# Patient Record
Sex: FEMALE | Race: OTHER
[De-identification: names, ages, dates, MRNs, and addresses within clinical notes are randomized per-mention and may not be internally consistent; named-entity substitution may affect disease eponyms.]

---

## 2022-12-30 ENCOUNTER — HOSPITAL ENCOUNTER (INPATIENT)
Dept: HOSPITAL 26 - MED | Age: 77
LOS: 5 days | Discharge: HOME | DRG: 425 | End: 2023-01-04
Attending: INTERNAL MEDICINE | Admitting: INTERNAL MEDICINE
Payer: COMMERCIAL

## 2022-12-30 VITALS — DIASTOLIC BLOOD PRESSURE: 68 MMHG | SYSTOLIC BLOOD PRESSURE: 160 MMHG

## 2022-12-30 VITALS — WEIGHT: 120 LBS | HEIGHT: 61 IN | BODY MASS INDEX: 22.66 KG/M2

## 2022-12-30 DIAGNOSIS — Z80.49: ICD-10-CM

## 2022-12-30 DIAGNOSIS — N18.6: ICD-10-CM

## 2022-12-30 DIAGNOSIS — I50.33: ICD-10-CM

## 2022-12-30 DIAGNOSIS — K76.82: ICD-10-CM

## 2022-12-30 DIAGNOSIS — I69.951: ICD-10-CM

## 2022-12-30 DIAGNOSIS — Z63.4: ICD-10-CM

## 2022-12-30 DIAGNOSIS — E87.70: Primary | ICD-10-CM

## 2022-12-30 DIAGNOSIS — F01.50: ICD-10-CM

## 2022-12-30 DIAGNOSIS — K74.60: ICD-10-CM

## 2022-12-30 DIAGNOSIS — Z99.2: ICD-10-CM

## 2022-12-30 DIAGNOSIS — Z80.8: ICD-10-CM

## 2022-12-30 DIAGNOSIS — E11.22: ICD-10-CM

## 2022-12-30 DIAGNOSIS — I25.10: ICD-10-CM

## 2022-12-30 DIAGNOSIS — Z56.0: ICD-10-CM

## 2022-12-30 DIAGNOSIS — E78.5: ICD-10-CM

## 2022-12-30 DIAGNOSIS — E44.1: ICD-10-CM

## 2022-12-30 DIAGNOSIS — E11.51: ICD-10-CM

## 2022-12-30 DIAGNOSIS — G93.41: ICD-10-CM

## 2022-12-30 DIAGNOSIS — I27.20: ICD-10-CM

## 2022-12-30 DIAGNOSIS — Z98.41: ICD-10-CM

## 2022-12-30 DIAGNOSIS — Z80.3: ICD-10-CM

## 2022-12-30 DIAGNOSIS — I13.2: ICD-10-CM

## 2022-12-30 DIAGNOSIS — Z95.5: ICD-10-CM

## 2022-12-30 DIAGNOSIS — Z90.721: ICD-10-CM

## 2022-12-30 DIAGNOSIS — Z20.822: ICD-10-CM

## 2022-12-30 LAB
ALBUMIN FLD-MCNC: 3 G/DL (ref 3.4–5)
ANION GAP SERPL CALCULATED.3IONS-SCNC: 13.6 MMOL/L (ref 8–16)
AST SERPL-CCNC: 25 U/L (ref 15–37)
BASOPHILS # BLD AUTO: 0.1 K/UL (ref 0–0.22)
BASOPHILS # BLD AUTO: 0.1 K/UL (ref 0–0.22)
BASOPHILS NFR BLD AUTO: 2.2 % (ref 0–2)
BASOPHILS NFR BLD AUTO: 2.2 % (ref 0–2)
BILIRUB SERPL-MCNC: 0.7 MG/DL (ref 0–1)
BUN SERPL-MCNC: 41 MG/DL (ref 7–18)
CHLORIDE SERPL-SCNC: 94 MMOL/L (ref 98–107)
CO2 SERPL-SCNC: 32.8 MMOL/L (ref 21–32)
CREAT SERPL-MCNC: 4.5 MG/DL (ref 0.6–1.3)
EOSINOPHIL # BLD AUTO: 0.1 K/UL (ref 0–0.4)
EOSINOPHIL # BLD AUTO: 0.1 K/UL (ref 0–0.4)
EOSINOPHIL NFR BLD AUTO: 1.4 % (ref 0–4)
EOSINOPHIL NFR BLD AUTO: 1.5 % (ref 0–4)
ERYTHROCYTE [DISTWIDTH] IN BLOOD BY AUTOMATED COUNT: 15.9 % (ref 11.6–13.7)
ERYTHROCYTE [DISTWIDTH] IN BLOOD BY AUTOMATED COUNT: 16.3 % (ref 11.6–13.7)
GFR SERPL CREATININE-BSD FRML MDRD: (no result) ML/MIN (ref 90–?)
GLUCOSE SERPL-MCNC: 239 MG/DL (ref 74–106)
HCT VFR BLD AUTO: 30.6 % (ref 36–48)
HCT VFR BLD AUTO: 30.7 % (ref 36–48)
HGB BLD-MCNC: 10.2 G/DL (ref 12–16)
HGB BLD-MCNC: 10.3 G/DL (ref 12–16)
LYMPHOCYTES # BLD AUTO: 0.8 K/UL (ref 2.5–16.5)
LYMPHOCYTES # BLD AUTO: 1.1 K/UL (ref 2.5–16.5)
LYMPHOCYTES NFR BLD AUTO: 17.6 % (ref 20.5–51.1)
LYMPHOCYTES NFR BLD AUTO: 21.1 % (ref 20.5–51.1)
MAGNESIUM SERPL-MCNC: 2.8 MG/DL (ref 1.8–2.4)
MCH RBC QN AUTO: 32 PG (ref 27–31)
MCH RBC QN AUTO: 32 PG (ref 27–31)
MCHC RBC AUTO-ENTMCNC: 33 G/DL (ref 33–37)
MCHC RBC AUTO-ENTMCNC: 34 G/DL (ref 33–37)
MCV RBC AUTO: 95.8 FL (ref 80–94)
MCV RBC AUTO: 96.6 FL (ref 80–94)
MONOCYTES # BLD AUTO: 0.4 K/UL (ref 0.8–1)
MONOCYTES # BLD AUTO: 0.8 K/UL (ref 0.8–1)
MONOCYTES NFR BLD AUTO: 10.3 % (ref 1.7–9.3)
MONOCYTES NFR BLD AUTO: 13 % (ref 1.7–9.3)
NEUTROPHILS # BLD AUTO: 2.5 K/UL (ref 1.8–7.7)
NEUTROPHILS # BLD AUTO: 4.2 K/UL (ref 1.8–7.7)
NEUTROPHILS NFR BLD AUTO: 64.9 % (ref 42.2–75.2)
NEUTROPHILS NFR BLD AUTO: 65.8 % (ref 42.2–75.2)
PLATELET # BLD AUTO: 224 K/UL (ref 140–450)
PLATELET # BLD AUTO: 225 K/UL (ref 140–450)
POTASSIUM SERPL-SCNC: 3.4 MMOL/L (ref 3.5–5.1)
RBC # BLD AUTO: 3.17 MIL/UL (ref 4.2–5.4)
RBC # BLD AUTO: 3.2 MIL/UL (ref 4.2–5.4)
SODIUM SERPL-SCNC: 137 MMOL/L (ref 136–145)
WBC # BLD AUTO: 3.9 K/UL (ref 4.8–10.8)
WBC # BLD AUTO: 6.3 K/UL (ref 4.8–10.8)

## 2022-12-30 PROCEDURE — 96375 TX/PRO/DX INJ NEW DRUG ADDON: CPT

## 2022-12-30 PROCEDURE — 96372 THER/PROPH/DIAG INJ SC/IM: CPT

## 2022-12-30 PROCEDURE — 99285 EMERGENCY DEPT VISIT HI MDM: CPT

## 2022-12-30 PROCEDURE — 96374 THER/PROPH/DIAG INJ IV PUSH: CPT

## 2022-12-30 PROCEDURE — G0378 HOSPITAL OBSERVATION PER HR: HCPCS

## 2022-12-30 PROCEDURE — C9113 INJ PANTOPRAZOLE SODIUM, VIA: HCPCS

## 2022-12-30 SDOH — SOCIAL STABILITY - SOCIAL INSECURITY: DISSAPEARANCE AND DEATH OF FAMILY MEMBER: Z63.4

## 2022-12-30 SDOH — ECONOMIC STABILITY - INCOME SECURITY: UNEMPLOYMENT, UNSPECIFIED: Z56.0

## 2022-12-30 NOTE — NUR
77F BIBA from dialysis with c/o ALOC and rectal bleeding. Per EMS, pt was half 
way through dialysis tx when becoming altered, pt was nonverbal when EMS 
arrived to dialysis center. Pt AOx1 in ED, oriented to own name only, but 
verbal upon assessment. Pt restless and agitated, continuously trying to get 
out of bed. Dr. Mcgill made aware of pt status upon arrival. Pt changed into 
gown, placed on bedside monitor, side rails x2.

## 2022-12-31 VITALS — SYSTOLIC BLOOD PRESSURE: 165 MMHG | DIASTOLIC BLOOD PRESSURE: 50 MMHG

## 2022-12-31 LAB
ALBUMIN FLD-MCNC: 2.6 G/DL (ref 3.4–5)
ANION GAP SERPL CALCULATED.3IONS-SCNC: 15.2 MMOL/L (ref 8–16)
AST SERPL-CCNC: 22 U/L (ref 15–37)
BASOPHILS # BLD AUTO: 0.1 K/UL (ref 0–0.22)
BASOPHILS NFR BLD AUTO: 1.1 % (ref 0–2)
BILIRUB SERPL-MCNC: 0.8 MG/DL (ref 0–1)
BUN SERPL-MCNC: 54 MG/DL (ref 7–18)
CHLORIDE SERPL-SCNC: 96 MMOL/L (ref 98–107)
CO2 SERPL-SCNC: 29.5 MMOL/L (ref 21–32)
CREAT SERPL-MCNC: 5.5 MG/DL (ref 0.6–1.3)
EOSINOPHIL # BLD AUTO: 0 K/UL (ref 0–0.4)
EOSINOPHIL NFR BLD AUTO: 0.5 % (ref 0–4)
ERYTHROCYTE [DISTWIDTH] IN BLOOD BY AUTOMATED COUNT: 16.3 % (ref 11.6–13.7)
GFR SERPL CREATININE-BSD FRML MDRD: (no result) ML/MIN (ref 90–?)
GLUCOSE SERPL-MCNC: 155 MG/DL (ref 74–106)
HCT VFR BLD AUTO: 29.4 % (ref 36–48)
HGB BLD-MCNC: 10 G/DL (ref 12–16)
LYMPHOCYTES # BLD AUTO: 1.4 K/UL (ref 2.5–16.5)
LYMPHOCYTES NFR BLD AUTO: 20 % (ref 20.5–51.1)
MAGNESIUM SERPL-MCNC: 3 MG/DL (ref 1.8–2.4)
MCH RBC QN AUTO: 33 PG (ref 27–31)
MCHC RBC AUTO-ENTMCNC: 34 G/DL (ref 33–37)
MCV RBC AUTO: 95.8 FL (ref 80–94)
MONOCYTES # BLD AUTO: 1 K/UL (ref 0.8–1)
MONOCYTES NFR BLD AUTO: 14.7 % (ref 1.7–9.3)
NEUTROPHILS # BLD AUTO: 4.3 K/UL (ref 1.8–7.7)
NEUTROPHILS NFR BLD AUTO: 63.7 % (ref 42.2–75.2)
PHOSPHATE SERPL-MCNC: 5 MG/DL (ref 2.5–4.9)
PLATELET # BLD AUTO: 221 K/UL (ref 140–450)
POTASSIUM SERPL-SCNC: 3.7 MMOL/L (ref 3.5–5.1)
RBC # BLD AUTO: 3.07 MIL/UL (ref 4.2–5.4)
SODIUM SERPL-SCNC: 137 MMOL/L (ref 136–145)
WBC # BLD AUTO: 6.8 K/UL (ref 4.8–10.8)

## 2022-12-31 RX ADMIN — DOCUSATE SODIUM SCH MG: 100 CAPSULE, LIQUID FILLED ORAL at 09:00

## 2022-12-31 RX ADMIN — FOLIC ACID SCH MLS/HR: 5 INJECTION, SOLUTION INTRAMUSCULAR; INTRAVENOUS; SUBCUTANEOUS at 18:50

## 2022-12-31 RX ADMIN — DEXTROSE SCH MLS/HR: 50 INJECTION, SOLUTION INTRAVENOUS at 14:36

## 2022-12-31 RX ADMIN — DEXTROSE SCH MLS/HR: 50 INJECTION, SOLUTION INTRAVENOUS at 22:45

## 2022-12-31 NOTE — NUR
RECEIVED REPORT FROM ER NURSE PRADEEP FOR CONTINUITY OF CARE. PATIENT IS A&O X0, 
NON-VERBAL. PATIENT IS ON ROOM AIR, BREATHING IS NORMAL WITH SYMMETRICAL RISE AND FALL OF 
CHEST. PATIENT'S IV IS A 20G R HAND, NO FLUIDS RUNNING (SALINE LOCKED). PATIENT IS LYING 
SEMI-FOWLERS IN BED. BED IS IN LOWEST POSITION, WHEELS LOCKED, CALL LIGHT IN PLACE. WILL 
CONTINUE TO OBSERVE PATIENT.

## 2022-12-31 NOTE — NUR
Received orders from Dr. Chaudhary, nephrologist, for IV D5W @25 cc/hour since 
patient is not eating and has no IV fluids ordered. Orders carried out.

## 2022-12-31 NOTE — NUR
RECEIVED CALL BACK FROM DR. DAMIAN. UPDATED ON PATIENT CONDITION AND 
QUESTIONED MAINTENANCE FLUID ORDER. PER  DISCONTINUE D5 FLUIDS AND WILL NOT 
NEED MAINTENANCE FLUIDS AT THIS TIME.

## 2022-12-31 NOTE — NUR
Patient will be admitted to care of Lora MCMULLEN. Admited to Telemetry.  Will go to 
room 114. Belongings list completed.  Report to Serge MERCADO.

## 2023-01-01 VITALS — DIASTOLIC BLOOD PRESSURE: 55 MMHG | SYSTOLIC BLOOD PRESSURE: 169 MMHG

## 2023-01-01 VITALS — DIASTOLIC BLOOD PRESSURE: 62 MMHG | SYSTOLIC BLOOD PRESSURE: 154 MMHG

## 2023-01-01 VITALS — SYSTOLIC BLOOD PRESSURE: 142 MMHG | DIASTOLIC BLOOD PRESSURE: 52 MMHG

## 2023-01-01 VITALS — SYSTOLIC BLOOD PRESSURE: 175 MMHG | DIASTOLIC BLOOD PRESSURE: 61 MMHG

## 2023-01-01 VITALS — SYSTOLIC BLOOD PRESSURE: 163 MMHG | DIASTOLIC BLOOD PRESSURE: 58 MMHG

## 2023-01-01 VITALS — DIASTOLIC BLOOD PRESSURE: 59 MMHG | SYSTOLIC BLOOD PRESSURE: 157 MMHG

## 2023-01-01 LAB
ALBUMIN FLD-MCNC: 2.9 G/DL (ref 3.4–5)
ANION GAP SERPL CALCULATED.3IONS-SCNC: 20.8 MMOL/L (ref 8–16)
AST SERPL-CCNC: 26 U/L (ref 15–37)
BASOPHILS # BLD AUTO: 0.1 K/UL (ref 0–0.22)
BASOPHILS NFR BLD AUTO: 0.9 % (ref 0–2)
BILIRUB SERPL-MCNC: 1.1 MG/DL (ref 0–1)
BUN SERPL-MCNC: 70 MG/DL (ref 7–18)
CHLORIDE SERPL-SCNC: 95 MMOL/L (ref 98–107)
CO2 SERPL-SCNC: 26.5 MMOL/L (ref 21–32)
CREAT SERPL-MCNC: 6.9 MG/DL (ref 0.6–1.3)
EOSINOPHIL # BLD AUTO: 0 K/UL (ref 0–0.4)
EOSINOPHIL NFR BLD AUTO: 0.1 % (ref 0–4)
ERYTHROCYTE [DISTWIDTH] IN BLOOD BY AUTOMATED COUNT: 16 % (ref 11.6–13.7)
GFR SERPL CREATININE-BSD FRML MDRD: (no result) ML/MIN (ref 90–?)
GLUCOSE SERPL-MCNC: 207 MG/DL (ref 74–106)
HCT VFR BLD AUTO: 31 % (ref 36–48)
HGB BLD-MCNC: 10.5 G/DL (ref 12–16)
LYMPHOCYTES # BLD AUTO: 1.2 K/UL (ref 2.5–16.5)
LYMPHOCYTES NFR BLD AUTO: 14.2 % (ref 20.5–51.1)
MAGNESIUM SERPL-MCNC: 2.9 MG/DL (ref 1.8–2.4)
MCH RBC QN AUTO: 33 PG (ref 27–31)
MCHC RBC AUTO-ENTMCNC: 34 G/DL (ref 33–37)
MCV RBC AUTO: 96.7 FL (ref 80–94)
MONOCYTES # BLD AUTO: 1.1 K/UL (ref 0.8–1)
MONOCYTES NFR BLD AUTO: 12.4 % (ref 1.7–9.3)
NEUTROPHILS # BLD AUTO: 6.2 K/UL (ref 1.8–7.7)
NEUTROPHILS NFR BLD AUTO: 72.4 % (ref 42.2–75.2)
PHOSPHATE SERPL-MCNC: 7.1 MG/DL (ref 2.5–4.9)
PLATELET # BLD AUTO: 243 K/UL (ref 140–450)
POTASSIUM SERPL-SCNC: 4.3 MMOL/L (ref 3.5–5.1)
RBC # BLD AUTO: 3.21 MIL/UL (ref 4.2–5.4)
SODIUM SERPL-SCNC: 138 MMOL/L (ref 136–145)
WBC # BLD AUTO: 8.5 K/UL (ref 4.8–10.8)

## 2023-01-01 PROCEDURE — 5A1D70Z PERFORMANCE OF URINARY FILTRATION, INTERMITTENT, LESS THAN 6 HOURS PER DAY: ICD-10-PCS | Performed by: INTERNAL MEDICINE

## 2023-01-01 RX ADMIN — DEXTROSE SCH MLS/HR: 50 INJECTION, SOLUTION INTRAVENOUS at 22:00

## 2023-01-01 RX ADMIN — DOCUSATE SODIUM SCH MG: 100 CAPSULE, LIQUID FILLED ORAL at 09:00

## 2023-01-01 RX ADMIN — FOLIC ACID SCH MLS/HR: 5 INJECTION, SOLUTION INTRAMUSCULAR; INTRAVENOUS; SUBCUTANEOUS at 18:50

## 2023-01-01 RX ADMIN — DEXTROSE SCH MLS/HR: 50 INJECTION, SOLUTION INTRAVENOUS at 13:00

## 2023-01-01 RX ADMIN — DEXTROSE SCH MLS/HR: 50 INJECTION, SOLUTION INTRAVENOUS at 04:39

## 2023-01-01 RX ADMIN — FOLIC ACID SCH MLS/HR: 5 INJECTION, SOLUTION INTRAMUSCULAR; INTRAVENOUS; SUBCUTANEOUS at 20:33

## 2023-01-01 NOTE — NUR
RECEIVED CRITICAL LAB ON PATIENT. BUN 70, CR 6.9. NOTIFIED NEPHROLOGIST DR. KHAN. DR KHAN ASKED ABOUT POTASSIUM, POTASSIUM WAS 4.3. DR KHAN, SAID THAT BUN ARE CR ARE FINE 
FOR DIALYSIS PATIENT, NO NEW ORDERS. WILL ENDORSE CARE TO DAY SHIFT NURSE.

## 2023-01-01 NOTE — NUR
STARTED PATIENT ON ZOSYN IVPB FOR 2100 MEDICATION. PATIENT CONTINUES TO BE NON-RESPONSIVE TO 
ANY QUESTIONS, JUST LYING IN BED. PATIENT DOES SELF-TURN. BREATHING IS NORMAL WITH 
SYMMETRICAL RISE AND FALL OF CHEST. WILL CONTINUE TO OBSERVE PATIENT.

## 2023-01-01 NOTE — NUR
RECEIVED REPORT FROM DAY SHIFT NURSE CATINA FOR CONTINUITY OF CARE. PATIENT IS A&O X0, 
NON-VERBAL. PATIENT IS ON ROOM AIR, BREATHING IS NORMAL WITH SYMMETRICAL RISE AND FALL OF 
CHEST. PATIENT'S IV IS A 22G RFA, RUNNING NS 20MEQ KCL. PATIENT IS LYING SUPINE IN BED. BED 
IS IN LOWEST POSITION, WHEELS LOCKED, CALL LIGHT IN PLACE. WILL CONTINUE TO OBSERVE PATIENT.

-------------------------------------------------------------------------------

Addendum: 01/01/23 at 2004 by Serge Wu RN

-------------------------------------------------------------------------------

PATIENT IS RUNNING D5 25ML

## 2023-01-01 NOTE — NUR
NOTIFIED ON CALL PHYSICIAN (DR. HERNANDEZ) FOR PATIENT'S BLOOD PRESSURE /55. MD ORDERED 
HYDRALAZINE 20MG EVERY 6HR PRN FOR BP OVER 160. ADMINISTERED MEDICATION TO PATIENT. PATIENT 
STILL LYING IN BED, NON-RESPONSIVE TO QUESTIONS. LOOKED IN ON PATIENT TO RE-ASSESS BP; 
PATIENT HAD RIPPED OUT HER IV. PATIENT WAS CLEANED UP, NEW BEDDING AND GOWN WAS APPLIED TO 
PATIENT. FLOOR WAS CLEANED UP FROM BLOOD AND BED RAILING WAS CLEANED. PATIENT ALSO HAD 
VOIDED. PATIENT WAS CLEANED WITH THE ASSISTANCE OF ROGELIO LOPEZ. NEW IV WAS PLACED BY ROGELIO LOPEZ, 
22G RFA. PATIENT IS LYING IN BED AND IS CALM, RESTING. WILL CONTINUE TO OBSERVE PATIENT.

## 2023-01-01 NOTE — NUR
PATIENT HAS BEEN SCREENED AND CATEGORIZED AS MODERATE NUTRITION RISK. PATIENT WILL BE SEEN 
WITHIN 3-5 DAYS OF ADMISSION. 

1/1/22-1/6/22 



ASYA [address]DIVIA RD

## 2023-01-02 VITALS — SYSTOLIC BLOOD PRESSURE: 147 MMHG | DIASTOLIC BLOOD PRESSURE: 86 MMHG

## 2023-01-02 VITALS — DIASTOLIC BLOOD PRESSURE: 53 MMHG | SYSTOLIC BLOOD PRESSURE: 176 MMHG

## 2023-01-02 VITALS — DIASTOLIC BLOOD PRESSURE: 98 MMHG | SYSTOLIC BLOOD PRESSURE: 156 MMHG

## 2023-01-02 VITALS — SYSTOLIC BLOOD PRESSURE: 157 MMHG | DIASTOLIC BLOOD PRESSURE: 40 MMHG

## 2023-01-02 VITALS — DIASTOLIC BLOOD PRESSURE: 98 MMHG | SYSTOLIC BLOOD PRESSURE: 142 MMHG

## 2023-01-02 VITALS — SYSTOLIC BLOOD PRESSURE: 163 MMHG | DIASTOLIC BLOOD PRESSURE: 64 MMHG

## 2023-01-02 LAB
ALBUMIN FLD-MCNC: 2.5 G/DL (ref 3.4–5)
ANION GAP SERPL CALCULATED.3IONS-SCNC: 18.8 MMOL/L (ref 8–16)
AST SERPL-CCNC: 41 U/L (ref 15–37)
BASOPHILS # BLD AUTO: 0.1 K/UL (ref 0–0.22)
BASOPHILS NFR BLD AUTO: 1.3 % (ref 0–2)
BILIRUB SERPL-MCNC: 1.2 MG/DL (ref 0–1)
BUN SERPL-MCNC: 34 MG/DL (ref 7–18)
CHLORIDE SERPL-SCNC: 98 MMOL/L (ref 98–107)
CO2 SERPL-SCNC: 27.5 MMOL/L (ref 21–32)
CREAT SERPL-MCNC: 4.4 MG/DL (ref 0.6–1.3)
EOSINOPHIL # BLD AUTO: 0 K/UL (ref 0–0.4)
EOSINOPHIL NFR BLD AUTO: 0.3 % (ref 0–4)
ERYTHROCYTE [DISTWIDTH] IN BLOOD BY AUTOMATED COUNT: 16.4 % (ref 11.6–13.7)
GFR SERPL CREATININE-BSD FRML MDRD: (no result) ML/MIN (ref 90–?)
GLUCOSE SERPL-MCNC: 105 MG/DL (ref 74–106)
HCT VFR BLD AUTO: 28.9 % (ref 36–48)
HGB BLD-MCNC: 9.8 G/DL (ref 12–16)
LYMPHOCYTES # BLD AUTO: 1.5 K/UL (ref 2.5–16.5)
LYMPHOCYTES NFR BLD AUTO: 17.5 % (ref 20.5–51.1)
MAGNESIUM SERPL-MCNC: 2.4 MG/DL (ref 1.8–2.4)
MCH RBC QN AUTO: 32 PG (ref 27–31)
MCHC RBC AUTO-ENTMCNC: 34 G/DL (ref 33–37)
MCV RBC AUTO: 95.3 FL (ref 80–94)
MONOCYTES # BLD AUTO: 1.1 K/UL (ref 0.8–1)
MONOCYTES NFR BLD AUTO: 13.4 % (ref 1.7–9.3)
NEUTROPHILS # BLD AUTO: 5.7 K/UL (ref 1.8–7.7)
NEUTROPHILS NFR BLD AUTO: 67.5 % (ref 42.2–75.2)
PHOSPHATE SERPL-MCNC: 4.7 MG/DL (ref 2.5–4.9)
PLATELET # BLD AUTO: 237 K/UL (ref 140–450)
POTASSIUM SERPL-SCNC: 3.3 MMOL/L (ref 3.5–5.1)
RBC # BLD AUTO: 3.03 MIL/UL (ref 4.2–5.4)
SODIUM SERPL-SCNC: 141 MMOL/L (ref 136–145)
WBC # BLD AUTO: 8.4 K/UL (ref 4.8–10.8)

## 2023-01-02 RX ADMIN — DEXTROSE SCH MLS/HR: 50 INJECTION, SOLUTION INTRAVENOUS at 04:16

## 2023-01-02 RX ADMIN — DEXTROSE SCH MLS/HR: 50 INJECTION, SOLUTION INTRAVENOUS at 21:04

## 2023-01-02 RX ADMIN — DOCUSATE SODIUM SCH MG: 100 CAPSULE, LIQUID FILLED ORAL at 09:00

## 2023-01-02 RX ADMIN — DEXTROSE SCH MLS/HR: 50 INJECTION, SOLUTION INTRAVENOUS at 13:36

## 2023-01-02 NOTE — NUR
PATIENT'S IV WAS NO LONGER PATENT. NEW IV WAS PLACED BY NURSE NELSON. NEW IV IS A 22G RIGHT 
THUMB. 2100 MEDICATION WAS ADMINISTERED TO PATIENT. PATIENT WAS CLEANED BY NURSE LESLIE AND 
MYSELF, PATIENT GIVEN NEW COVERS AND CHUCKS. PATIENT TOLERATED WELL. PULLED NEW BAG OF D5 
FOR PATIENT AND SETUP SECONDARY IV PUMP. WILL CONTINUE TO OBSERVE PATIENT.

## 2023-01-02 NOTE — NUR
PATIENT HAD BM AND WAS CLEANED BY NURSE LESLIE AND MYSELF. PATIENT TOLERATED WELL. IV FLUID IS 
RUNNING. BREATHING IS NORMAL WITH SYMMETRICAL RISE AND FALL OF CHEST. WILL CONTINUE TO 
OBSERVE PATIENT.

## 2023-01-02 NOTE — NUR
RECEIVED REPORT FROM DAY SHIFT RN FOR CONTINUITY OF CARE. PT IS SLEEPING WITH NO RESPIRATORY 
DISTRESS. PT IS ON RA. FAMILY BY BEDSIDE. PT HAS RIGHT WRIST 24 GAUGE SALINE LOCK. BED AT 
THE LOWEST POSITION. HEAD OF THE BED RAISED. WILL CONTINUE TO MONITOR THE PT.

## 2023-01-03 VITALS — DIASTOLIC BLOOD PRESSURE: 32 MMHG | SYSTOLIC BLOOD PRESSURE: 117 MMHG

## 2023-01-03 VITALS — DIASTOLIC BLOOD PRESSURE: 45 MMHG | SYSTOLIC BLOOD PRESSURE: 171 MMHG

## 2023-01-03 VITALS — DIASTOLIC BLOOD PRESSURE: 55 MMHG | SYSTOLIC BLOOD PRESSURE: 160 MMHG

## 2023-01-03 VITALS — DIASTOLIC BLOOD PRESSURE: 40 MMHG | SYSTOLIC BLOOD PRESSURE: 156 MMHG

## 2023-01-03 VITALS — SYSTOLIC BLOOD PRESSURE: 159 MMHG | DIASTOLIC BLOOD PRESSURE: 41 MMHG

## 2023-01-03 VITALS — SYSTOLIC BLOOD PRESSURE: 138 MMHG | DIASTOLIC BLOOD PRESSURE: 50 MMHG

## 2023-01-03 LAB
ALBUMIN FLD-MCNC: 2.4 G/DL (ref 3.4–5)
ANION GAP SERPL CALCULATED.3IONS-SCNC: 21.2 MMOL/L (ref 8–16)
AST SERPL-CCNC: 52 U/L (ref 15–37)
BASOPHILS # BLD AUTO: 0.1 K/UL (ref 0–0.22)
BASOPHILS NFR BLD AUTO: 2 % (ref 0–2)
BILIRUB SERPL-MCNC: 1.3 MG/DL (ref 0–1)
BUN SERPL-MCNC: 49 MG/DL (ref 7–18)
CHLORIDE SERPL-SCNC: 99 MMOL/L (ref 98–107)
CO2 SERPL-SCNC: 26.2 MMOL/L (ref 21–32)
CREAT SERPL-MCNC: 5.9 MG/DL (ref 0.6–1.3)
EOSINOPHIL # BLD AUTO: 0.1 K/UL (ref 0–0.4)
EOSINOPHIL NFR BLD AUTO: 1.3 % (ref 0–4)
ERYTHROCYTE [DISTWIDTH] IN BLOOD BY AUTOMATED COUNT: 15.9 % (ref 11.6–13.7)
GFR SERPL CREATININE-BSD FRML MDRD: (no result) ML/MIN (ref 90–?)
GLUCOSE SERPL-MCNC: 96 MG/DL (ref 74–106)
HCT VFR BLD AUTO: 28 % (ref 36–48)
HGB BLD-MCNC: 9.4 G/DL (ref 12–16)
LYMPHOCYTES # BLD AUTO: 1.5 K/UL (ref 2.5–16.5)
LYMPHOCYTES NFR BLD AUTO: 22.8 % (ref 20.5–51.1)
MAGNESIUM SERPL-MCNC: 2.6 MG/DL (ref 1.8–2.4)
MCH RBC QN AUTO: 32 PG (ref 27–31)
MCHC RBC AUTO-ENTMCNC: 34 G/DL (ref 33–37)
MCV RBC AUTO: 96.1 FL (ref 80–94)
MONOCYTES # BLD AUTO: 1 K/UL (ref 0.8–1)
MONOCYTES NFR BLD AUTO: 15.1 % (ref 1.7–9.3)
NEUTROPHILS # BLD AUTO: 4 K/UL (ref 1.8–7.7)
NEUTROPHILS NFR BLD AUTO: 58.8 % (ref 42.2–75.2)
PHOSPHATE SERPL-MCNC: 6.1 MG/DL (ref 2.5–4.9)
PLATELET # BLD AUTO: 237 K/UL (ref 140–450)
POTASSIUM SERPL-SCNC: 3.4 MMOL/L (ref 3.5–5.1)
RBC # BLD AUTO: 2.91 MIL/UL (ref 4.2–5.4)
SODIUM SERPL-SCNC: 143 MMOL/L (ref 136–145)
WBC # BLD AUTO: 6.7 K/UL (ref 4.8–10.8)

## 2023-01-03 PROCEDURE — 5A1D70Z PERFORMANCE OF URINARY FILTRATION, INTERMITTENT, LESS THAN 6 HOURS PER DAY: ICD-10-PCS | Performed by: INTERNAL MEDICINE

## 2023-01-03 RX ADMIN — DEXTROSE SCH MLS/HR: 50 INJECTION, SOLUTION INTRAVENOUS at 04:20

## 2023-01-03 RX ADMIN — FOLIC ACID SCH MLS/HR: 5 INJECTION, SOLUTION INTRAMUSCULAR; INTRAVENOUS; SUBCUTANEOUS at 19:35

## 2023-01-03 RX ADMIN — DOCUSATE SODIUM SCH MG: 100 CAPSULE, LIQUID FILLED ORAL at 10:25

## 2023-01-03 RX ADMIN — DEXTROSE SCH MLS/HR: 50 INJECTION, SOLUTION INTRAVENOUS at 21:35

## 2023-01-03 RX ADMIN — DEXTROSE SCH MLS/HR: 50 INJECTION, SOLUTION INTRAVENOUS at 13:36

## 2023-01-03 NOTE — NUR
SCHEDULED AND PRESCRIBED MEDICATION WAS GIVEN TO PT AS PER MD ORDER. ALL SAFETY MEASURES 
IMPLEMENTED. BED IN LOW POSITION, BED WHEELS ON LOCK AND CALL LIGHT WITHIN REACH.

## 2023-01-03 NOTE — NUR
***** PHYSICAL THERAPY CO-SIGN *****

The Physical Therapy Progress Notes documented by Physical Therapy Assistant have been 
reviewed.



Reviewed/Co-Signed by: Angelica Oneal

Documentation Done by: JESE WONG PTA

-------------------------------------------------------------------------------

Addendum: 01/03/23 at 1550 by Angelica Oneal PT

-------------------------------------------------------------------------------

Amended: Links added.

## 2023-01-03 NOTE — NUR
RECEIVED PT FROM MORNING SHIFT NURSE. PT IS AOX3-4, BEDBOUND, ABLE TO VERBALIZE NEEDS AND 
ABLE TO FOLLOW COMMANDS. PT IS ON ROOM AIR WITH CCHO DIET. PT HAS IV ON RIGHT HAND AC GAUGE 
22 RUNNING WITH D5W AT 25 ML/HR. PT SKIN IS INTACT. PT DENIES PAIN AT THIS TIME. NO S/S OF 
RESPIRATORY DISTRESS NOTED. ALL SAFETY MEASURES IMPLEMENTED. BED IN LOW POSITION, BED WHEELS 
ON LOCK AND CALL LIGHT WITHIN REACH.

## 2023-01-03 NOTE — NUR
PT WAS CLEANED AND CHANGED. PT TOLERATED WELL. PT IS BECOMING MORE ALERT AND IS ABLE TO 
SPEAK FEW WORDS AND NEEDS. PT SAID SHE IS COLD. WARM BLANKETS PROVIDED.

## 2023-01-03 NOTE — NUR
PT. WITH LOW VANESSA SCALE AT MODERATE TO HIGH RISK, CONTINUE TO FOLLOW PRESSURE INJURY 
PREVENTION INTERVENTIONS.

-POSITIONING:

TURN AND REPOSITION PATIENT Q 2H OR SOONER

USE PILLOWS TO KEEP BONY PROMINENCES FROM DIRECT CONTACT WITH SURFACES

USE REPOSITIONING WEDGES TO PROVIDE 30-DEGREE ANGLE FOR SIDE LYING POSITIONS

OFFLOADING OR FOAM DRESSING TO ALL TUBING TO PREVENT MEDICAL DEVICES RELATED PRESSURE INJURY

-RE-EVALUATING AND MANAGING INCONTINENCE

MONITOR SKIN CONDITION DURING POSITION CHANGE

DO NOT MASSAGE REDNESS, BONY PROMINENCES

FREQUENT WILLARD-CARE AND PROVIDE BARRIER CREAMS PRN IF SOILING

MOISTURE CONTROL BY OFFER BED PAN/URINAL /ABSORBENT PAD TO WICK AND HOLD MOISTURE

KEEP SKIN DRY AND PROTECT FROM FRICTION

-MANAGE FRICTION/SHEAR/MOBILITY

KEEP HOB AT THE LOWEST LEVEL OF ELEVATION NO MORE THAN 30 DEGREE UNLESS OTHERWISE 
CONTRAINDICATED

USE LIFT SHEET OR TRANSFER DEVICE TO MOVE PATIENT AND PREVENT LATERAL SHEER.

PROTECT HEELS, ELBOWS BONY PROMINENCES WITH SKIN BERRIES OR FOAM DRESSING IF EXPOSED TO 
FRICTION

OFFLOAD BILATERAL HEELS BY PLACING PILLOWS UNDER CALVES AT ALL TIMES, UNLESS OTHERWISE 
CONTRAINDICATED

-PRESSURE REDISTRIBUTION SURFACE THERAPY IRENE ISOFLEX MATTRESS

-NUTRITION:

PLEASE FOLLOW RD RECOMMENDATIONS AND OFFER NUTRITION SUPPLEMENTS IF ORDERED.

PLEASE CONTACT WOUND CARE NURSE FOR ANY QUESTION AND CHANGE OF WOUND CONDITION.

## 2023-01-03 NOTE — NUR
DISCHARGE PLANNING 

PATIENT IS A 77 YEAR OLD FEMALE ADMITTED TO THE [facility]/ED ON 01/01/2023 DUE TO TRANSIT 
ISCHEMIC ATTACK.  

SW MEET WITH PATIENT AND HER DAUGTHER ANJELICA SOTO AT BEDSIDE [phone], TO DISCUSS 
AND GATHER HER COLLATERAL INFORMATION. PATIENT WAS AWAKE AND BUT UNABLE TO PROVIDE HER 
INFORMATION. THEREFORE; HER DAUGTHER PROVIDED FOR HER.  PER PATIENT'S DAUGTHER SHE LIVES 
HOME WITH HER IN [place].  SHE ALSO HAS HER BROTHER AND OTHER FAMILY MEMBERS THAT 
ASSIST HER WITH PATIENT'S CARE AND HER NEEDS.  PER PATIENT'S DAUGTHER SHE IS HER EMERGENCY 
CONTACT AND MEDICAL DECISIONS MAKER. PATIENT DO HAVE ADVANCE DIRECTIVES IN PLACE. AND 
PATIENT'S DAUGTHER DECLINED INF.  FORMS PROVIDED BY SW. PER PATIENT'S DAUGTHER HER AND OTHER 
SIBLINGS AND FAMILY MEMBERS TAKE PATIENT TO HER PCP DR.DON DUONG AND REPORTED THAT HER 
[place] VISIT WITH PCP WAS ABOUT 3 WEEKS AGO.  PER PATIENT'S DAUGTHER. PCP HAS A GOOD GRASP OF 
PATIENTS CARE AND HELPS WITH WHAT HE CAN. HER APPOINTMENTS 

ARE USUALLY EVERY 3 MONTHS. SW DISCUSSED WITH PATIENT AND DAUGTHER OF THE IMPORTANCE OF 
MAKING AND APPOINTMENT FOR FOLLOW UP AND THEY DECLINED FOR SW TO MAKE IT. STATED THAT SHE 
WILL MAKE ONE IF THEY NEED ONE WHEN DISCHARGE HAPPENS. PER PATIENT'S DAUGTHER ANJELICA 
REPORTED THAT SHE IS WAITING ON HER BROTHER TO ARRIVE FROM [facility] TO MAKE IMPORTANT 
DECISIONS ABOUT POSSIBLE HOSPICE FOR PATIENT AND MAY BE STOPING DIALYSIS IF HOSPICE WILL BE 
THE DECISIONS THAT SHE AND HER BROTHER WILL AGREE FOR PATIENT. PER PATIENT'S DAUGTHER SHE 
WILL LIKE FOR PATIENT TO HAVE HOSPICE WITH A AND AT [facility]. BUT WILL 
HAVE TO MAKE DECISIONS ONCE HER BROTHER ARRIVES. SW DISCUSS ABOUT PATIENT'S CARE AT HOME AND 
PATIENT'S DAUGHTER STATED THAT SHE HAS NO ISSUES WITH MEDICATIONS ANDS THEY GET THEM FROM 
[facility] IN [place]. PATIENT'S DAUGTHER ALSO REPORTED THAT PATIENT HAS A HOSPITAL 
BED, A WALKER, AND A WHEELCHAIR  AT HOME AS HER DME. SW THANKED PATIENT'S DAUGTHER FOR ALL 
THE INFORMATION PROVIDED AND ENDED THE VISIT.  SW/CM WILL FOLLOW UP AS NEEDED. THEY HAVE ONE 
ALREADY FOR NEXT WEEK. WITH PCP AYDEN CONKLIN.  PATIENT STATED NOT HAVING ANY ISSUES 
GETTING OR TAKING HER MEDICATIONS AND 

REPORTED THAT SHE GETS HER MEDICATIONS FROM [facility] IN [place] IN [facility]. 
PER PATIENT SHE HAS A WALKER AND A CANE AS HER ONLY DME AT HOME AND REPORTED WANTING TO GO 
BACK HOME WHEN SHE IS READY AND 

STABLE FOR DISCHARGE.  PATIENT IS NOT OPEN FOR MD RECOMMENDATION TO SNF.  SW THANKED PATIENT 
AND DAUGTHER FOR THE INFORMATION PROVIDED AND ENDED THE VISIT.  SW/CM WILL FOLLOW UP AS 
NEEDED.

## 2023-01-04 VITALS — DIASTOLIC BLOOD PRESSURE: 52 MMHG | SYSTOLIC BLOOD PRESSURE: 166 MMHG

## 2023-01-04 VITALS — DIASTOLIC BLOOD PRESSURE: 48 MMHG | SYSTOLIC BLOOD PRESSURE: 163 MMHG

## 2023-01-04 VITALS — SYSTOLIC BLOOD PRESSURE: 140 MMHG | DIASTOLIC BLOOD PRESSURE: 51 MMHG

## 2023-01-04 VITALS — SYSTOLIC BLOOD PRESSURE: 95 MMHG | DIASTOLIC BLOOD PRESSURE: 42 MMHG

## 2023-01-04 LAB
ALBUMIN FLD-MCNC: 2.4 G/DL (ref 3.4–5)
ANION GAP SERPL CALCULATED.3IONS-SCNC: 16.7 MMOL/L (ref 8–16)
AST SERPL-CCNC: 48 U/L (ref 15–37)
BASOPHILS # BLD AUTO: 0.1 K/UL (ref 0–0.22)
BASOPHILS NFR BLD AUTO: 1.7 % (ref 0–2)
BILIRUB SERPL-MCNC: 1.5 MG/DL (ref 0–1)
BUN SERPL-MCNC: 36 MG/DL (ref 7–18)
CHLORIDE SERPL-SCNC: 98 MMOL/L (ref 98–107)
CO2 SERPL-SCNC: 25.7 MMOL/L (ref 21–32)
CREAT SERPL-MCNC: 4.1 MG/DL (ref 0.6–1.3)
EOSINOPHIL # BLD AUTO: 0.2 K/UL (ref 0–0.4)
EOSINOPHIL NFR BLD AUTO: 2.8 % (ref 0–4)
ERYTHROCYTE [DISTWIDTH] IN BLOOD BY AUTOMATED COUNT: 16.1 % (ref 11.6–13.7)
GFR SERPL CREATININE-BSD FRML MDRD: (no result) ML/MIN (ref 90–?)
GLUCOSE SERPL-MCNC: 131 MG/DL (ref 74–106)
HCT VFR BLD AUTO: 29.3 % (ref 36–48)
HGB BLD-MCNC: 9.9 G/DL (ref 12–16)
LYMPHOCYTES # BLD AUTO: 1.7 K/UL (ref 2.5–16.5)
LYMPHOCYTES NFR BLD AUTO: 27.5 % (ref 20.5–51.1)
MAGNESIUM SERPL-MCNC: 2.2 MG/DL (ref 1.8–2.4)
MCH RBC QN AUTO: 33 PG (ref 27–31)
MCHC RBC AUTO-ENTMCNC: 34 G/DL (ref 33–37)
MCV RBC AUTO: 97 FL (ref 80–94)
MONOCYTES # BLD AUTO: 0.9 K/UL (ref 0.8–1)
MONOCYTES NFR BLD AUTO: 13.7 % (ref 1.7–9.3)
NEUTROPHILS # BLD AUTO: 3.4 K/UL (ref 1.8–7.7)
NEUTROPHILS NFR BLD AUTO: 54.3 % (ref 42.2–75.2)
PHOSPHATE SERPL-MCNC: 4.1 MG/DL (ref 2.5–4.9)
PLATELET # BLD AUTO: 234 K/UL (ref 140–450)
POTASSIUM SERPL-SCNC: 3.4 MMOL/L (ref 3.5–5.1)
RBC # BLD AUTO: 3.02 MIL/UL (ref 4.2–5.4)
SODIUM SERPL-SCNC: 137 MMOL/L (ref 136–145)
WBC # BLD AUTO: 6.2 K/UL (ref 4.8–10.8)

## 2023-01-04 RX ADMIN — DEXTROSE SCH MLS/HR: 50 INJECTION, SOLUTION INTRAVENOUS at 05:21

## 2023-01-04 RX ADMIN — DEXTROSE SCH MLS/HR: 50 INJECTION, SOLUTION INTRAVENOUS at 13:04

## 2023-01-04 RX ADMIN — DOCUSATE SODIUM SCH MG: 100 CAPSULE, LIQUID FILLED ORAL at 09:32

## 2023-01-04 NOTE — NUR
DISCHARGE PATIENT HOME IN STABLE CONDITION PER PCP ORDER. DISCHARGE CONSENT SIGNED BY 
PATIENT, DISCHARGE INSTRUCTION GIVE TO PATIENT WITH 3 FAMILY MEMBER PRESENT.  IV ACCESS, TEL 
MONITOR, AND WRIST BAND REMOVED.

## 2023-01-04 NOTE — NUR
PT IS SLEEPING. CHEST RISE AND FALL SYMMETRICALLY NOTED. RESPIRATION IS EVEN AND UNLABORED. 
NO S/S OF RESPIRATORY DISTRESS NOTED. ALL SAFETY MEASURES IMPLEMENTED. BED IN LOW POSITION, 
BED WHEELS ON LOCK AND CALL LIGHT WITHIN REACH.

## 2023-01-04 NOTE — NUR
SCHEDULED AND PRESCRIBED MEDICATION WAS GIVEN TO PT AS PER MD ORDER. PT DENIES PAIN AT THIS 
TIME. NO S/S OF RESPIRATORY DISTRESS NOTED. ALL SAFETY MEASURES IMPLEMENTED. BED IN LOW 
POSITION, BED WHEELS ON LOCK AND CALL LIGHT WITHIN REACH.

## 2023-01-04 NOTE — NUR
MORNING CARE WAS DONE TO PT. CHANGED CHUCKS, LINENS AND GOWN.  PT DENIES PAIN AT THIS TIME. 
NO S/S OF RESPIRATORY DISTRESS NOTED. ALL SAFETY MEASURES IMPLEMENTED. BED IN LOW POSITION, 
BED WHEELS ON LOCK AND CALL LIGHT WITHIN REACH.

## 2023-01-04 NOTE — NUR
CHECKED THE PT STILL SLEEPING. CHEST RISE AND FALL SYMMETRICALLY NOTED. RESPIRATION IS EVEN 
AND UNLABORED. NO S/S OF RESPIRATORY DISTRESS NOTED. ALL SAFETY MEASURES IMPLEMENTED. BED IN 
LOW POSITION, BED WHEELS ON LOCK AND CALL LIGHT WITHIN REACH.